# Patient Record
Sex: MALE | Race: WHITE | NOT HISPANIC OR LATINO | URBAN - METROPOLITAN AREA
[De-identification: names, ages, dates, MRNs, and addresses within clinical notes are randomized per-mention and may not be internally consistent; named-entity substitution may affect disease eponyms.]

---

## 2023-03-01 LAB
NON-UH HIE ALB: 3.6 G/DL (ref 3.4–5)
NON-UH HIE ALK PHOS: 76 UNIT/L (ref 46–116)
NON-UH HIE BILIRUBIN, DIRECT: 0.3 MG/DL (ref 0–0.4)
NON-UH HIE BILIRUBIN, TOTAL: 0.9 MG/DL (ref 0.2–1)
NON-UH HIE GOT: 31 UNIT/L (ref 15–37)
NON-UH HIE GPT: 20 UNIT/L (ref 10–49)
NON-UH HIE TOTAL PROTEIN: 6.5 G/DL (ref 5.7–8.2)

## 2023-04-28 ENCOUNTER — APPOINTMENT (OUTPATIENT)
Dept: PRIMARY CARE | Facility: CLINIC | Age: 88
End: 2023-04-28
Payer: MEDICARE

## 2023-04-28 ENCOUNTER — TELEPHONE (OUTPATIENT)
Dept: PRIMARY CARE | Facility: CLINIC | Age: 88
End: 2023-04-28

## 2023-04-28 RX ORDER — FLUCONAZOLE 100 MG/1
100 TABLET ORAL DAILY
Qty: 10 TABLET | Refills: 0 | Status: CANCELLED | OUTPATIENT
Start: 2023-04-28 | End: 2023-05-08

## 2023-04-28 RX ORDER — FLUCONAZOLE 100 MG/1
100 TABLET ORAL DAILY
COMMUNITY
End: 2023-05-05

## 2023-04-28 NOTE — TELEPHONE ENCOUNTER
I SPOKE WITH THE PATIENT AND HE SAID TO THANK DR LLAMAS FOR BEING FAIR AND SENDING IN HIS SCRIPT FOR HIS RASH.HE IS AWARE THAT IF THE RASH IS NOT BETTER HE WILL NEED TO MAKE AN APT TO COME IN TO BE SEEN.  THANK YOU

## 2023-05-04 DIAGNOSIS — B35.6 TINEA CRURIS: ICD-10-CM

## 2023-05-05 RX ORDER — FLUCONAZOLE 100 MG/1
TABLET ORAL
Qty: 30 TABLET | Refills: 0 | Status: SHIPPED | OUTPATIENT
Start: 2023-05-05 | End: 2023-06-15

## 2023-05-28 DIAGNOSIS — B35.6 TINEA CRURIS: ICD-10-CM

## 2023-06-15 RX ORDER — FLUCONAZOLE 100 MG/1
TABLET ORAL
Qty: 30 TABLET | Refills: 0 | Status: SHIPPED | OUTPATIENT
Start: 2023-06-15 | End: 2023-06-28 | Stop reason: SDUPTHER

## 2023-06-24 DIAGNOSIS — B35.6 TINEA CRURIS: ICD-10-CM

## 2023-06-28 RX ORDER — FLUCONAZOLE 100 MG/1
TABLET ORAL
Qty: 30 TABLET | Refills: 0 | Status: SHIPPED | OUTPATIENT
Start: 2023-06-28 | End: 2024-03-08 | Stop reason: ALTCHOICE

## 2023-09-19 ENCOUNTER — TELEPHONE (OUTPATIENT)
Dept: PRIMARY CARE | Facility: CLINIC | Age: 88
End: 2023-09-19

## 2023-09-19 NOTE — TELEPHONE ENCOUNTER
Patient called and is asking for a call back regarding medication. He is declining to schedule an apt

## 2023-09-20 NOTE — TELEPHONE ENCOUNTER
I spoke with patient  And he is concerned with his skin turning purple/black bruising, he said from the plavix and that he was going to stop it  He said he said you in hospital briefly a few days ago and you said a rx would be sent over    He doesn't want to take this plavix  Wants you to know    I advised him to make an appt he said he would in few weeks since he's weak from the colonoscopy     Please advise

## 2023-11-09 PROBLEM — K63.5 COLON POLYPS: Status: ACTIVE | Noted: 2023-09-18

## 2023-11-09 PROBLEM — R56.9 SEIZURE (MULTI): Status: ACTIVE | Noted: 2023-11-09

## 2023-11-09 PROBLEM — G25.3 MYOCLONUS: Status: ACTIVE | Noted: 2023-11-09

## 2023-11-09 PROBLEM — Z86.79 HISTORY OF HYPERTENSION: Status: ACTIVE | Noted: 2023-11-09

## 2023-11-09 PROBLEM — R25.1 SHAKING: Status: ACTIVE | Noted: 2023-11-09

## 2023-11-09 PROBLEM — C61 PROSTATE CANCER (MULTI): Status: ACTIVE | Noted: 2023-11-09

## 2023-11-09 PROBLEM — E55.9 MILD VITAMIN D DEFICIENCY: Status: ACTIVE | Noted: 2023-11-09

## 2023-11-09 PROBLEM — G25.3: Status: ACTIVE | Noted: 2023-11-09

## 2023-11-09 PROBLEM — R25.1 TREMOR: Status: ACTIVE | Noted: 2023-11-09

## 2023-11-09 PROBLEM — R55 SYNCOPE AND COLLAPSE: Status: ACTIVE | Noted: 2023-11-09

## 2023-11-09 PROBLEM — K57.30 DIVERTICULOSIS OF COLON WITHOUT DIVERTICULITIS: Status: ACTIVE | Noted: 2023-09-18

## 2023-11-09 PROBLEM — I73.9 PERIPHERAL VASCULAR DISEASE (CMS-HCC): Status: ACTIVE | Noted: 2023-06-21

## 2023-11-09 PROBLEM — I48.91 AFIB (MULTI): Status: ACTIVE | Noted: 2023-11-09

## 2023-11-09 PROBLEM — M19.90 OSTEOARTHRITIS: Status: ACTIVE | Noted: 2023-11-09

## 2023-11-09 PROBLEM — C61 MALIGNANT NEOPLASM OF PROSTATE (MULTI): Status: ACTIVE | Noted: 2023-11-09

## 2023-11-09 PROBLEM — R79.89 ELEVATED LFTS: Status: ACTIVE | Noted: 2023-11-09

## 2023-11-09 PROBLEM — G62.9 PERIPHERAL NERVE DISEASE: Status: ACTIVE | Noted: 2023-11-09

## 2023-11-09 PROBLEM — R79.89 ABNORMAL LIVER FUNCTION TESTS: Status: ACTIVE | Noted: 2023-11-09

## 2023-11-09 PROBLEM — E29.1 HYPOGONADISM MALE: Status: ACTIVE | Noted: 2023-11-09

## 2023-11-09 PROBLEM — K62.7 PROCTITIS, RADIATION: Status: ACTIVE | Noted: 2023-09-18

## 2023-11-09 PROBLEM — H00.016 HORDEOLUM EXTERNUM OF LEFT EYE: Status: ACTIVE | Noted: 2023-11-09

## 2023-11-09 PROBLEM — G47.9 SLEEP DISTURBANCE: Status: ACTIVE | Noted: 2023-11-09

## 2023-11-09 PROBLEM — Q23.0 CONGENITAL STENOSIS OF AORTIC VALVE (HHS-HCC): Status: ACTIVE | Noted: 2023-06-21

## 2023-11-09 PROBLEM — M17.11 OSTEOARTHRITIS OF RIGHT KNEE: Status: ACTIVE | Noted: 2023-11-09

## 2023-11-09 PROBLEM — B35.6 TINEA CRURIS: Status: ACTIVE | Noted: 2023-11-09

## 2023-11-09 PROBLEM — L30.9 ECZEMA: Status: ACTIVE | Noted: 2023-11-09

## 2023-11-09 PROBLEM — G56.00 CARPAL TUNNEL SYNDROME: Status: ACTIVE | Noted: 2023-11-09

## 2023-11-09 PROBLEM — E78.5 HYPERLIPIDEMIA: Status: ACTIVE | Noted: 2023-11-09

## 2023-11-09 PROBLEM — I70.201: Status: ACTIVE | Noted: 2023-11-09

## 2023-11-09 PROBLEM — E78.00 HYPERCHOLESTEROLEMIA: Status: ACTIVE | Noted: 2023-11-09

## 2023-11-09 PROBLEM — I73.9 PAD (PERIPHERAL ARTERY DISEASE) (CMS-HCC): Status: ACTIVE | Noted: 2023-11-09

## 2023-11-09 PROBLEM — G47.00 INSOMNIA: Status: ACTIVE | Noted: 2023-11-09

## 2023-11-09 PROBLEM — R21 SKIN RASH: Status: ACTIVE | Noted: 2023-11-09

## 2023-11-09 PROBLEM — R60.0 BILATERAL LEG EDEMA: Status: ACTIVE | Noted: 2023-11-09

## 2023-11-09 PROBLEM — I10 HYPERTENSION: Status: ACTIVE | Noted: 2023-06-21

## 2023-11-09 PROBLEM — G62.9 NEUROPATHY, PERIPHERAL: Status: ACTIVE | Noted: 2023-11-09

## 2023-11-09 PROBLEM — S91.102A OPEN WOUND OF LEFT GREAT TOE: Status: ACTIVE | Noted: 2023-11-09

## 2023-11-09 PROBLEM — N28.9 ABNORMAL KIDNEY FUNCTION: Status: ACTIVE | Noted: 2023-11-09

## 2023-11-09 PROBLEM — C61 CARCINOMA OF PROSTATE (MULTI): Status: ACTIVE | Noted: 2023-11-09

## 2023-11-09 PROBLEM — M17.12 OSTEOARTHRITIS OF LEFT KNEE: Status: ACTIVE | Noted: 2023-11-09

## 2023-11-09 PROBLEM — S80.212A ABRASION OF LEFT KNEE: Status: ACTIVE | Noted: 2023-11-09

## 2023-11-09 PROBLEM — I70.209 ATHEROSCLEROSIS OF ARTERY OF LOWER EXTREMITY (CMS-HCC): Status: ACTIVE | Noted: 2023-11-09

## 2023-11-09 RX ORDER — DOXEPIN HYDROCHLORIDE 25 MG/1
1-3 CAPSULE ORAL NIGHTLY PRN
COMMUNITY
Start: 2019-07-11 | End: 2024-03-08 | Stop reason: ALTCHOICE

## 2023-11-09 RX ORDER — QUETIAPINE FUMARATE 50 MG/1
50 TABLET, FILM COATED ORAL NIGHTLY
COMMUNITY
Start: 2022-10-24 | End: 2024-03-08 | Stop reason: SDUPTHER

## 2023-11-09 RX ORDER — CILOSTAZOL 50 MG/1
TABLET ORAL 2 TIMES DAILY
COMMUNITY

## 2023-11-09 RX ORDER — TAMSULOSIN HYDROCHLORIDE 0.4 MG/1
0.4 CAPSULE ORAL DAILY
COMMUNITY

## 2023-11-09 RX ORDER — CLOPIDOGREL BISULFATE 75 MG/1
75 TABLET ORAL DAILY
COMMUNITY

## 2023-11-09 RX ORDER — ALPRAZOLAM 1 MG/1
1 TABLET ORAL DAILY PRN
COMMUNITY
Start: 2020-06-08

## 2023-11-13 ENCOUNTER — OFFICE VISIT (OUTPATIENT)
Dept: DERMATOLOGY | Facility: CLINIC | Age: 88
End: 2023-11-13
Payer: MEDICARE

## 2023-11-13 DIAGNOSIS — L57.0 ACTINIC KERATOSIS: Primary | ICD-10-CM

## 2023-11-13 PROCEDURE — 1159F MED LIST DOCD IN RCRD: CPT | Performed by: NURSE PRACTITIONER

## 2023-11-13 PROCEDURE — 99204 OFFICE O/P NEW MOD 45 MIN: CPT | Performed by: NURSE PRACTITIONER

## 2023-11-13 ASSESSMENT — ITCH NUMERIC RATING SCALE
HOW SEVERE IS YOUR ITCHING?: 0
HOW SEVERE IS YOUR ITCHING?: 0

## 2023-11-13 NOTE — PROGRESS NOTES
Subjective     Jose Eduardo Munson is a 89 y.o. male who presents for the following: Multiple lesions.     New patient visit.  Patient in for multiple lesions on face present x1 year patient is treating at home with Neosporin and or shaving with a razor to remove flaky skin.    Review of Systems:  No other skin or systemic complaints other than what is documented elsewhere in the note.    The following portions of the chart were reviewed this encounter and updated as appropriate:       Skin Cancer History  No skin cancer on file.    Specialty Problems          Dermatology Problems    Abrasion of left knee    Eczema    Open wound of left great toe    Skin rash    Tinea cruris     Past Medical History:  Jose Eduardo Munson  has a past medical history of Personal history of other diseases of the circulatory system, Personal history of other diseases of the nervous system and sense organs, and Pure hypercholesterolemia, unspecified.    Past Surgical History:  Jose Eduardo Munson  has a past surgical history that includes Gallbladder surgery (02/29/2016).    Family History:  Patient family history is not on file.    Social History:  Jose Eduardo Munson  has no history on file for tobacco use, alcohol use, and drug use.    Allergies:  Atorvastatin, Lovastatin, and Simvastatin    Current Medications / CAM's:    Current Outpatient Medications:     ALPRAZolam (Xanax) 1 mg tablet, Take 1 tablet (1 mg) by mouth once daily as needed., Disp: , Rfl:     apixaban (Eliquis) 5 mg tablet, Take 2 tablets (10 mg) by mouth once daily., Disp: , Rfl:     aspirin 81 mg capsule, Take 81 mg by mouth once daily., Disp: , Rfl:     cilostazol (Pletal) 50 mg tablet, Take by mouth 2 times a day., Disp: , Rfl:     clopidogrel (Plavix) 75 mg tablet, Take 1 tablet (75 mg) by mouth once daily., Disp: , Rfl:     doxepin (SINEquan) 25 mg capsule, Take 1-3 capsules (25-75 mg) by mouth as needed at bedtime., Disp: , Rfl:     fluconazole (Diflucan) 100 mg tablet, TAKE 1 TABLET  BY MOUTH EVERY DAY, Disp: 30 tablet, Rfl: 0    furosemide (Lasix) 20 mg tablet, TAKE 1 TABLET BY MOUTH EVERY DAY, Disp: 90 tablet, Rfl: 2    QUEtiapine (SEROquel) 50 mg tablet, Take 1 tablet (50 mg) by mouth once daily at bedtime., Disp: , Rfl:     tamsulosin (Flomax) 0.4 mg 24 hr capsule, Take 1 capsule (0.4 mg) by mouth once daily., Disp: , Rfl:      Objective   Well appearing patient in no apparent distress; mood and affect are within normal limits.    Assessment/Plan   1. Actinic keratosis (7)  Left Buccal Cheek, Left Forehead, Mid Forehead (2), Right Buccal Cheek (2), Right Forehead  Erythematous macules with gritty scale.    ACTINIC KERATOSIS    Lesions are due to cumulative sun damage over time and are pre-cancerous. They have a risk (although small in majority of patients) of developing into squamous cell carcinoma and therefore treatment recommendations were offered and discussed with the patient. Discussed LN2, photodynamic (blue light) therapy & topical chemotherapy creams, risks and benefits of each.    I counseled the patient regarding the following:  Skin Care: Sun protective clothing and sunblock can prevent the formation of Actinic Keratoses. AKs can resolve with cryotherapy, photodynamic therapy, imiquimod, topical 5-FU.  Expectations: Actinic Keratoses are precancerous proliferations that occur within sun damaged skin.If untreated, a small subset of AKs can develop into Squamous Cell Carcinoma.  Contact Office if: If AKs fail to resolve despite treatment, or if you develop a side effect from therapy ,such as unbearable crusting,scabbing ,redness and tenderness                 Destr of lesion - Left Buccal Cheek, Left Forehead, Mid Forehead (2), Right Buccal Cheek (2), Right Forehead  Complexity: simple    Destruction method: cryotherapy    Informed consent: discussed and consent obtained    Lesion destroyed using liquid nitrogen: Yes    Cryotherapy cycles:  1  Outcome: patient tolerated procedure  well with no complications    Post-procedure details: wound care instructions given

## 2024-01-11 ENCOUNTER — APPOINTMENT (OUTPATIENT)
Dept: PRIMARY CARE | Facility: CLINIC | Age: 89
End: 2024-01-11
Payer: MEDICARE

## 2024-02-07 ENCOUNTER — APPOINTMENT (OUTPATIENT)
Dept: DERMATOLOGY | Facility: CLINIC | Age: 89
End: 2024-02-07
Payer: MEDICARE

## 2024-02-07 ENCOUNTER — APPOINTMENT (OUTPATIENT)
Dept: PRIMARY CARE | Facility: CLINIC | Age: 89
End: 2024-02-07
Payer: MEDICARE

## 2024-03-08 ENCOUNTER — OFFICE VISIT (OUTPATIENT)
Dept: PRIMARY CARE | Facility: CLINIC | Age: 89
End: 2024-03-08
Payer: MEDICARE

## 2024-03-08 VITALS
HEIGHT: 71 IN | TEMPERATURE: 97.2 F | SYSTOLIC BLOOD PRESSURE: 149 MMHG | OXYGEN SATURATION: 96 % | DIASTOLIC BLOOD PRESSURE: 90 MMHG | HEART RATE: 71 BPM | WEIGHT: 178 LBS | BODY MASS INDEX: 24.92 KG/M2

## 2024-03-08 DIAGNOSIS — I70.209 ATHEROSCLEROSIS OF ARTERY OF LOWER EXTREMITY (CMS-HCC): ICD-10-CM

## 2024-03-08 DIAGNOSIS — G47.9 SLEEP DISTURBANCE: ICD-10-CM

## 2024-03-08 DIAGNOSIS — I73.9 PAD (PERIPHERAL ARTERY DISEASE) (CMS-HCC): ICD-10-CM

## 2024-03-08 DIAGNOSIS — Z00.00 VISIT FOR PREVENTIVE HEALTH EXAMINATION: Primary | ICD-10-CM

## 2024-03-08 DIAGNOSIS — T14.8XXA OPEN WOUND: ICD-10-CM

## 2024-03-08 DIAGNOSIS — G25.3: ICD-10-CM

## 2024-03-08 DIAGNOSIS — I48.0 PAROXYSMAL ATRIAL FIBRILLATION (MULTI): ICD-10-CM

## 2024-03-08 DIAGNOSIS — R60.0 LOCALIZED EDEMA: ICD-10-CM

## 2024-03-08 DIAGNOSIS — H10.32 ACUTE CONJUNCTIVITIS OF LEFT EYE, UNSPECIFIED ACUTE CONJUNCTIVITIS TYPE: ICD-10-CM

## 2024-03-08 PROBLEM — C61 PROSTATE CANCER (MULTI): Status: RESOLVED | Noted: 2023-11-09 | Resolved: 2024-03-08

## 2024-03-08 PROBLEM — C61 MALIGNANT NEOPLASM OF PROSTATE (MULTI): Status: RESOLVED | Noted: 2023-11-09 | Resolved: 2024-03-08

## 2024-03-08 PROBLEM — C61 CARCINOMA OF PROSTATE (MULTI): Status: RESOLVED | Noted: 2023-11-09 | Resolved: 2024-03-08

## 2024-03-08 PROBLEM — I70.201: Status: RESOLVED | Noted: 2023-11-09 | Resolved: 2024-03-08

## 2024-03-08 PROBLEM — R56.9 SEIZURE (MULTI): Status: RESOLVED | Noted: 2023-11-09 | Resolved: 2024-03-08

## 2024-03-08 PROCEDURE — 1036F TOBACCO NON-USER: CPT | Performed by: FAMILY MEDICINE

## 2024-03-08 PROCEDURE — 87070 CULTURE OTHR SPECIMN AEROBIC: CPT

## 2024-03-08 PROCEDURE — 1158F ADVNC CARE PLAN TLK DOCD: CPT | Performed by: FAMILY MEDICINE

## 2024-03-08 PROCEDURE — 99497 ADVNCD CARE PLAN 30 MIN: CPT | Performed by: FAMILY MEDICINE

## 2024-03-08 PROCEDURE — 1159F MED LIST DOCD IN RCRD: CPT | Performed by: FAMILY MEDICINE

## 2024-03-08 PROCEDURE — G0439 PPPS, SUBSEQ VISIT: HCPCS | Performed by: FAMILY MEDICINE

## 2024-03-08 PROCEDURE — 1157F ADVNC CARE PLAN IN RCRD: CPT | Performed by: FAMILY MEDICINE

## 2024-03-08 PROCEDURE — G0444 DEPRESSION SCREEN ANNUAL: HCPCS | Performed by: FAMILY MEDICINE

## 2024-03-08 PROCEDURE — 1160F RVW MEDS BY RX/DR IN RCRD: CPT | Performed by: FAMILY MEDICINE

## 2024-03-08 PROCEDURE — 1170F FXNL STATUS ASSESSED: CPT | Performed by: FAMILY MEDICINE

## 2024-03-08 PROCEDURE — 3077F SYST BP >= 140 MM HG: CPT | Performed by: FAMILY MEDICINE

## 2024-03-08 PROCEDURE — 87075 CULTR BACTERIA EXCEPT BLOOD: CPT

## 2024-03-08 PROCEDURE — 99214 OFFICE O/P EST MOD 30 MIN: CPT | Performed by: FAMILY MEDICINE

## 2024-03-08 PROCEDURE — 3080F DIAST BP >= 90 MM HG: CPT | Performed by: FAMILY MEDICINE

## 2024-03-08 PROCEDURE — 87205 SMEAR GRAM STAIN: CPT

## 2024-03-08 PROCEDURE — 1123F ACP DISCUSS/DSCN MKR DOCD: CPT | Performed by: FAMILY MEDICINE

## 2024-03-08 RX ORDER — FUROSEMIDE 20 MG/1
20 TABLET ORAL DAILY
Qty: 90 TABLET | Refills: 3 | Status: SHIPPED | OUTPATIENT
Start: 2024-03-08

## 2024-03-08 RX ORDER — TOBRAMYCIN 3 MG/ML
2 SOLUTION/ DROPS OPHTHALMIC EVERY 4 HOURS
Qty: 5 ML | Refills: 0 | Status: SHIPPED | OUTPATIENT
Start: 2024-03-08 | End: 2024-03-22

## 2024-03-08 RX ORDER — QUETIAPINE FUMARATE 50 MG/1
50 TABLET, FILM COATED ORAL NIGHTLY
Qty: 90 TABLET | Refills: 3 | Status: SHIPPED | OUTPATIENT
Start: 2024-03-08 | End: 2025-03-08

## 2024-03-08 RX ORDER — SULFAMETHOXAZOLE AND TRIMETHOPRIM 800; 160 MG/1; MG/1
1 TABLET ORAL 2 TIMES DAILY
Qty: 28 TABLET | Refills: 0 | Status: SHIPPED | OUTPATIENT
Start: 2024-03-08 | End: 2024-03-22

## 2024-03-08 RX ORDER — CEPHALEXIN 500 MG/1
500 CAPSULE ORAL 3 TIMES DAILY
Qty: 42 CAPSULE | Refills: 0 | Status: SHIPPED | OUTPATIENT
Start: 2024-03-08 | End: 2024-03-22

## 2024-03-08 ASSESSMENT — LIFESTYLE VARIABLES
HOW OFTEN DO YOU HAVE A DRINK CONTAINING ALCOHOL: 4 OR MORE TIMES A WEEK
SKIP TO QUESTIONS 9-10: 1
HOW OFTEN DO YOU HAVE SIX OR MORE DRINKS ON ONE OCCASION: NEVER
AUDIT-C TOTAL SCORE: 4
HOW MANY STANDARD DRINKS CONTAINING ALCOHOL DO YOU HAVE ON A TYPICAL DAY: 1 OR 2

## 2024-03-08 ASSESSMENT — ACTIVITIES OF DAILY LIVING (ADL)
TAKING_MEDICATION: INDEPENDENT
DRESSING: INDEPENDENT
DOING_HOUSEWORK: INDEPENDENT
BATHING: INDEPENDENT
GROCERY_SHOPPING: NEEDS ASSISTANCE
MANAGING_FINANCES: INDEPENDENT

## 2024-03-08 ASSESSMENT — PATIENT HEALTH QUESTIONNAIRE - PHQ9
SUM OF ALL RESPONSES TO PHQ9 QUESTIONS 1 AND 2: 0
2. FEELING DOWN, DEPRESSED OR HOPELESS: NOT AT ALL
SUM OF ALL RESPONSES TO PHQ9 QUESTIONS 1 AND 2: 0
1. LITTLE INTEREST OR PLEASURE IN DOING THINGS: NOT AT ALL
1. LITTLE INTEREST OR PLEASURE IN DOING THINGS: NOT AT ALL
2. FEELING DOWN, DEPRESSED OR HOPELESS: NOT AT ALL

## 2024-03-08 NOTE — PROGRESS NOTES
Subjective   Patient ID: Jose Eduardo Munson is a 89 y.o. male who presents for Medicare Annual Wellness Visit Subsequent.    Assessment/Plan     Problem List Items Addressed This Visit       RESOLVED: Peripheral vascular disease (CMS/HCC)    PAD (peripheral artery disease) (CMS/[facility])    Atherosclerosis of artery of lower extremity (CMS/HCC)    Afib (CMS/[facility])    Visit for preventive health examination - Primary     Other Visit Diagnoses       Localized edema              Up-to-date with pneumonia vaccine  Patient stopped all medication currently taking only Lasix and aspirin   Received flu vaccine  Consider Seroquel - risk and benefit discussed - pt wants to cont - tolerating well  Fasting lab work today  Follow-up in 6 months  Come back early with any new signs and symptoms  Patient agrees.     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 -tried multiple medication in the past including Xanax clonazepam doxepin Topamax Keppra without much help-also tried Lamictal given skin rash  For insomnia - Trazodone Ambien and Elavil Klonopin Lunesta and belsomra - not helping with sleep with jerks    Patient is taking Seroquel at night and significantly helping tolerating well risk-benefit discussed in detail about dizziness drowsiness fall patient agreed wants to continue    Also complaining of right lower extremity open wound not healing since long time following with wound care  Patient will discharge with surrounding redness  Is will consider Keflex Bactrim consider wound culture patient agrees continue with wound care     Bilateral lower extremity chronic swelling on Lasix     Patient does not want to follow up with neurology     Was advised patient to follow-up with oncology. Currently not following     elevated PSA was see by Dr. Mcbride diagnosed with prostate cancer -was on radiotherapy - last PSA level - 4.13 -patient can follow-up with  urology  hypertension - restarted lisinopril - not taking  hyperlipidemia  peripheral neuropathy complaining of gabapentin isn't effective stopped  ED  sleep disturbance  Atrial fibrillation on eliquis and BB - was seen by cardiology = Eliquis was stopped by GI  Chronic left lower extremity pain with swelling patient says possibly with radiation is getting worse - I told the patient that is less likely radiation (prostate cancer) is making his pain worse - most likely lumbar radiculopathy versus peripheral neuropathy needs to be seen by neurology  Right knee osteoarthritis status post Kenalog Injection on previous visit     [facility] - phone no [phone] ext [phone]  carotid Doppler done did reveal mild stenosis in bilateral internal carotid arteries     I spent >16 minutes discussing Advance Care Planning including the explanation and discussion of advance directives. If patient does not have current up to date documents, examples and information provided on how to create both Living Will and Power of . Patient was encouraged to work on completing these documents.. Patient has living will and his significant other Isamar is a healthcare power of . Patient is full code     Allergies   Allergen Reactions    Atorvastatin Myalgia    Lovastatin Myalgia    Simvastatin Myalgia and Other       Current Outpatient Medications   Medication Sig Dispense Refill    aspirin 81 mg capsule Take 81 mg by mouth once daily.      cilostazol (Pletal) 50 mg tablet Take by mouth 2 times a day.      furosemide (Lasix) 20 mg tablet TAKE 1 TABLET BY MOUTH EVERY DAY 90 tablet 2    QUEtiapine (SEROquel) 50 mg tablet Take 1 tablet (50 mg) by mouth once daily at bedtime.      ALPRAZolam (Xanax) 1 mg tablet Take 1 tablet (1 mg) by mouth once daily as needed.      apixaban (Eliquis) 5 mg tablet Take 2 tablets (10 mg) by mouth once daily.      clopidogrel (Plavix) 75 mg tablet Take 1 tablet  "(75 mg) by mouth once daily.      tamsulosin (Flomax) 0.4 mg 24 hr capsule Take 1 capsule (0.4 mg) by mouth once daily.       No current facility-administered medications for this visit.       Objective   Visit Vitals  /90 (BP Location: Left arm, Patient Position: Sitting)   Pulse 71   Temp 36.2 °C (97.2 °F)   Ht 1.803 m (5' 11\")   Wt 80.7 kg (178 lb)   SpO2 96%   BMI 24.83 kg/m²   Smoking Status Never   BSA 2.01 m²     Physical Exam  Constitutional:       General: He is not in acute distress.     Appearance: Normal appearance.   HENT:      Head: Normocephalic and atraumatic.      Nose: Nose normal.   Eyes:      Extraocular Movements: Extraocular movements intact.      Conjunctiva/sclera: Conjunctivae normal.   Cardiovascular:      Rate and Rhythm: Normal rate and regular rhythm.   Pulmonary:      Effort: Pulmonary effort is normal.      Breath sounds: Normal breath sounds.   Skin:     General: Skin is warm.   Neurological:      Mental Status: He is alert and oriented to person, place, and time.   Psychiatric:         Mood and Affect: Mood normal.         Behavior: Behavior normal.     Right lower extremity open wound present with erythema and drainage  Also right second toe redness swelling present  Immunization History   Administered Date(s) Administered    Flu vaccine (IIV4), preservative free *Check age/dose* 10/26/2016    Flu vaccine, quadrivalent, high-dose, preservative free, age 65y+ (FLUZONE) 09/27/2021, 10/17/2022, 10/11/2023    Influenza, High Dose Seasonal, Preservative Free 10/17/2017, 10/25/2018, 10/03/2019, 09/30/2020    Influenza, Seasonal, Quadrivalent, Adjuvanted 09/22/2020    Influenza, injectable, quadrivalent 10/26/2016    Pfizer COVID-19 vaccine, Fall 2023, 12 years and older, (30mcg/0.3mL) 10/11/2023    Pfizer COVID-19 vaccine, bivalent, age 12 years and older (30 mcg/0.3 mL) 10/21/2022    Pfizer Purple Cap SARS-CoV-2 01/25/2021, 02/12/2021, 09/27/2021    Pneumococcal conjugate vaccine, " 13-valent (PREVNAR 13) 10/26/2016    Tdap vaccine, age 7 year and older (BOOSTRIX, ADACEL) 08/05/2020       Review of Systems    No visits with results within 4 Month(s) from this visit.   Latest known visit with results is:   Orders Only on 03/01/2023   Component Date Value Ref Range Status    [facility] GPT 03/01/2023 20  10 - 49 unit/L Final    [facility] GOT 03/01/2023 31  15 - 37 unit/L Final    [facility] ALB 03/01/2023 3.6  3.4 - 5.0 g/dL Final    [facility] Bilirubin, Direct 03/01/2023 0.30  0.00 - 0.40 mg/dL Final    [facility] Alk Phos 03/01/2023 76  46 - 116 unit/L Final    [facility] Total Protein 03/01/2023 6.5  5.7 - 8.2 g/dL Final    [facility] Bilirubin, Total 03/01/2023 0.90  0.20 - 1.00 mg/dL Final       Radiology: Reviewed imaging in powerchart.  No results found.    No family history on file.  Social History     Socioeconomic History    Marital status:      Spouse name: None    Number of children: None    Years of education: None    Highest education level: None   Occupational History    None   Tobacco Use    Smoking status: Never    Smokeless tobacco: Never   Substance and Sexual Activity    Alcohol use: Yes     Alcohol/week: 7.0 standard drinks of alcohol     Types: 7 Shots of liquor per week    Drug use: Never    Sexual activity: None   Other Topics Concern    None   Social History Narrative    None     Social Determinants of Health     Financial Resource Strain: Not on file   Food Insecurity: Not on file   Transportation Needs: Not on file   Physical Activity: Not on file   Stress: Not on file   Social Connections: Not on file   Intimate Partner Violence: Not on file   Housing Stability: Not on file     Past Medical History:   Diagnosis Date    Malignant neoplasm of prostate (CMS/HCC) 11/09/2023    Personal history of other diseases of the circulatory system     History of hypertension    Personal history of other diseases of the nervous system and sense organs     History of  peripheral neuropathy    Prostate cancer (CMS/HCC) 11/09/2023    Pure hypercholesterolemia, unspecified     High cholesterol     Past Surgical History:   Procedure Laterality Date    GALLBLADDER SURGERY  02/29/2016    Gallbladder Surgery       Charting was completed using voice recognition technology and may include unintended errors.

## 2024-03-09 ENCOUNTER — LAB REQUISITION (OUTPATIENT)
Dept: LAB | Facility: HOSPITAL | Age: 89
End: 2024-03-09
Payer: MEDICARE

## 2024-03-09 DIAGNOSIS — T14.8XXA OTHER INJURY OF UNSPECIFIED BODY REGION, INITIAL ENCOUNTER: ICD-10-CM

## 2024-03-13 LAB
BACTERIA SPEC CULT: ABNORMAL
GRAM STN SPEC: ABNORMAL
GRAM STN SPEC: ABNORMAL

## 2024-03-28 LAB
NON-UH HIE A/G RATIO: 1.1
NON-UH HIE ALB: 3.6 G/DL (ref 3.4–5)
NON-UH HIE ALK PHOS: 133 UNIT/L (ref 45–117)
NON-UH HIE BILIRUBIN, TOTAL: 0.7 MG/DL (ref 0.3–1.2)
NON-UH HIE BUN/CREAT RATIO: 26.5
NON-UH HIE BUN: 45 MG/DL (ref 9–23)
NON-UH HIE CALCIUM: 9.2 MG/DL (ref 8.7–10.4)
NON-UH HIE CALCULATED OSMOLALITY: 282 MOSM/KG (ref 275–295)
NON-UH HIE CHLORIDE: 106 MMOL/L (ref 98–107)
NON-UH HIE CO2, VENOUS: 24 MMOL/L (ref 20–31)
NON-UH HIE CREATININE: 1.7 MG/DL (ref 0.6–1.1)
NON-UH HIE GFR AA: 46
NON-UH HIE GLOBULIN: 3.4 G/DL
NON-UH HIE GLOMERULAR FILTRATION RATE: 38 ML/MIN/1.73M?
NON-UH HIE GLUCOSE: 107 MG/DL (ref 74–106)
NON-UH HIE GOT: 38 UNIT/L (ref 15–37)
NON-UH HIE GPT: 34 UNIT/L (ref 10–49)
NON-UH HIE HCT: 41 % (ref 41–52)
NON-UH HIE HGB: 13.9 G/DL (ref 13.5–17.5)
NON-UH HIE INSTR WBC ND: 4.9
NON-UH HIE K: 4.7 MMOL/L (ref 3.5–5.1)
NON-UH HIE MCH: 32.2 PG (ref 27–34)
NON-UH HIE MCHC: 33.9 G/DL (ref 32–37)
NON-UH HIE MCV: 94.8 FL (ref 80–100)
NON-UH HIE MPV: 8.3 FL (ref 7.4–10.4)
NON-UH HIE NA: 135 MMOL/L (ref 135–145)
NON-UH HIE PLATELET: 169 X10 (ref 150–450)
NON-UH HIE RBC: 4.32 X10 (ref 4.7–6.1)
NON-UH HIE RDW: 15.3 % (ref 11.5–14.5)
NON-UH HIE TOTAL PROTEIN: 7 G/DL (ref 5.7–8.2)
NON-UH HIE TSH: 2.59 UIU/ML (ref 0.55–4.78)
NON-UH HIE WBC: 4.9 X10 (ref 4.5–11)

## 2024-10-28 ENCOUNTER — LAB REQUISITION (OUTPATIENT)
Dept: LAB | Facility: HOSPITAL | Age: 89
End: 2024-10-28
Payer: MEDICARE

## 2024-10-29 LAB
LABORATORY COMMENT REPORT: NORMAL
PATH REPORT.GROSS SPEC: NORMAL
PATH REPORT.TOTAL CANCER: NORMAL